# Patient Record
Sex: FEMALE | Race: WHITE | NOT HISPANIC OR LATINO | Employment: OTHER | URBAN - METROPOLITAN AREA
[De-identification: names, ages, dates, MRNs, and addresses within clinical notes are randomized per-mention and may not be internally consistent; named-entity substitution may affect disease eponyms.]

---

## 2023-10-13 ENCOUNTER — OFFICE VISIT (OUTPATIENT)
Dept: URGENT CARE | Facility: CLINIC | Age: 68
End: 2023-10-13
Payer: MEDICARE

## 2023-10-13 VITALS
OXYGEN SATURATION: 95 % | SYSTOLIC BLOOD PRESSURE: 122 MMHG | BODY MASS INDEX: 27.07 KG/M2 | RESPIRATION RATE: 16 BRPM | DIASTOLIC BLOOD PRESSURE: 89 MMHG | HEART RATE: 107 BPM | WEIGHT: 148 LBS | TEMPERATURE: 98.1 F

## 2023-10-13 DIAGNOSIS — E86.0 DEHYDRATION: ICD-10-CM

## 2023-10-13 DIAGNOSIS — R10.84 GENERALIZED ABDOMINAL PAIN: Primary | ICD-10-CM

## 2023-10-13 PROCEDURE — 99203 OFFICE O/P NEW LOW 30 MIN: CPT | Performed by: PHYSICIAN ASSISTANT

## 2023-10-13 PROCEDURE — 1159F MED LIST DOCD IN RCRD: CPT | Performed by: PHYSICIAN ASSISTANT

## 2023-10-13 PROCEDURE — 1036F TOBACCO NON-USER: CPT | Performed by: PHYSICIAN ASSISTANT

## 2023-10-13 RX ORDER — ATORVASTATIN CALCIUM 10 MG/1
10 TABLET, FILM COATED ORAL DAILY
COMMUNITY

## 2023-10-13 RX ORDER — LEVOTHYROXINE SODIUM 112 UG/1
112 CAPSULE ORAL
COMMUNITY
Start: 2015-08-27

## 2023-10-13 RX ORDER — FLUTICASONE PROPIONATE 50 MCG
1 SPRAY, SUSPENSION (ML) NASAL DAILY
COMMUNITY
Start: 2015-11-20

## 2023-10-13 RX ORDER — ALENDRONATE SODIUM 70 MG/1
70 TABLET ORAL
COMMUNITY

## 2023-10-13 RX ORDER — MINERAL OIL
ENEMA (ML) RECTAL
COMMUNITY
Start: 2014-07-22

## 2023-10-13 ASSESSMENT — ENCOUNTER SYMPTOMS
DIARRHEA: 1
ABDOMINAL PAIN: 1

## 2023-10-13 NOTE — PROGRESS NOTES
Subjective   Patient ID: Fawn Dillard is a 67 y.o. female.    Patient is a 67-year-old female who complains of worsening generalized abdominal pain and diarrhea that she has been experiencing for the past 1 week.  Patient reports that she has not developed episodes of nausea or vomiting, however she is experiencing difficulty tolerating food and fluids.  Patient describes bile cramping associated with an overall bloating sensation to her diffuse abdomen.  Patient reports no history of GERD, hiatal hernia, gallbladder disease, diverticulitis or other GI conditions.  Patient states she is feeling increasingly weak and ill.      Diarrhea  Associated symptoms: abdominal pain        The following portions of the chart were reviewed this encounter and updated as appropriate:         Review of Systems   Gastrointestinal:  Positive for abdominal pain and diarrhea.   All other systems reviewed and are negative.    Objective   Physical Exam  Vitals and nursing note reviewed.   Constitutional:       Appearance: Normal appearance. She is normal weight. She is ill-appearing.   HENT:      Head: Normocephalic.      Mouth/Throat:      Mouth: Mucous membranes are moist.      Pharynx: Oropharynx is clear.   Eyes:      Extraocular Movements: Extraocular movements intact.      Conjunctiva/sclera: Conjunctivae normal.      Pupils: Pupils are equal, round, and reactive to light.   Pulmonary:      Effort: Pulmonary effort is normal.      Breath sounds: Normal breath sounds.   Abdominal:      General: Abdomen is flat.      Palpations: Abdomen is soft.      Tenderness: There is abdominal tenderness.   Skin:     General: Skin is warm and dry.      Capillary Refill: Capillary refill takes less than 2 seconds.   Neurological:      General: No focal deficit present.      Mental Status: She is alert and oriented to person, place, and time.   Psychiatric:         Mood and Affect: Mood normal.         Behavior: Behavior normal.          Thought Content: Thought content normal.         Judgment: Judgment normal.       Procedures    Assessment/Plan   Physical exam findings as noted above.  Patient was immediately advised that she requires evaluation in an emergency department for laboratory testing, IV fluid hydration and CT scan imaging can be obtained.  Patient verbalizes clear understanding of the above recommendation and states that she will report to an emergency department immediately after departing this urgent care facility.    CLINICAL IMPRESSION:  Abdominal Pain;  Dehydration

## 2023-11-15 ENCOUNTER — OFFICE VISIT (OUTPATIENT)
Dept: SURGERY | Facility: CLINIC | Age: 68
End: 2023-11-15
Payer: MEDICARE

## 2023-11-15 DIAGNOSIS — K82.8 SLUDGE IN GALLBLADDER: Primary | ICD-10-CM

## 2023-11-15 DIAGNOSIS — K21.9 GASTROESOPHAGEAL REFLUX DISEASE, UNSPECIFIED WHETHER ESOPHAGITIS PRESENT: ICD-10-CM

## 2023-11-15 PROCEDURE — 1159F MED LIST DOCD IN RCRD: CPT | Performed by: PHYSICIAN ASSISTANT

## 2023-11-15 PROCEDURE — 99213 OFFICE O/P EST LOW 20 MIN: CPT | Performed by: PHYSICIAN ASSISTANT

## 2023-11-15 PROCEDURE — 1036F TOBACCO NON-USER: CPT | Performed by: PHYSICIAN ASSISTANT

## 2023-11-15 NOTE — PROGRESS NOTES
Subjective   Patient ID: Fawn Gomezdczuk is a 68 y.o. female who presents for New Patient Visit (Gallbladder sluge).    HPI  This 68-year-old female was having some digestive issues few weeks back.  She thought she had the flu.  Having some mid epigastric pain and reflux issues.  There was a work-up done CAT scan and ultrasound and showed sludge in her gallbladder.  She was placed on an PPI she has had improvement.  But was sent here by family practice because of the sludge in her gallbladder.  She is not currently having any abdominal pain no nausea no vomiting.      Review of Systems  Negative other than mentioned in HPI    ENT: No earache, no sore throat, no nosebleeds  Cardiovascular: No chest pain, no shortness of breath, no leg pain, no edema  Respiratory: No shortness of breath on exertion, no wheezing  Gastrointestinal: No abdominal pain, no melena, no nausea, vomiting and/or diarrhea  Musculoskeletal: No pain moving all extremities, no back pain ambulating normally  Skin: No rashes, no lesions, and no skin changes  Neuro: No headache, no confusion, no numbness and tingling  Psychiatric, normal mood, not suicidal, not homicidal, feeling good        Physical Exam  Eyes: Conjunctiva non -icteric and eye lids are without obvious rash or drooping. Pupils are symmetric.   Ears, Nose, Mouth, and Throat: External ears and nose appear to be without deformity or rash. No lesions or masses noted. Hearing is grossly intact.   Neck:. No JVD noted, tracheal position is midline. No thyromegaly, no thyroid nodules  Head and Face: Examination of the head and face revealed no abnormalities.   Respiratory: No gasping or shortness of breath noted, no use of accessory muscles noted. Clear to auscultate bilaterally  Cardiovascular: Examination for edema is normal. Regular rate and rhythm S1 S2 without murmurs  GI: Abdomen non tender to palpation, bowel sounds present no hepatosplenomegaly  Skin: No rashes or open  lesions/ulcers identified on skin.   Musk: Digits/nails show no clubbing or cyanosis. No asymmetry or masses noted of the musculature. Examination of the muscles/joints/bones show normal range of motion. Gait is grossly normally.   Neurologic: Cranial nerves II- XII intact, motor strength 5/5 muscle strength of the lower extremities bilaterally and equal.      Objective     No diagnosis found.   There is no problem list on file for this patient.     No Known Allergies   Medication Documentation Review Audit       Reviewed by Alicja Dvais MA (Medical Assistant) on 11/15/23 at 1319      Medication Order Taking? Sig Documenting Provider Last Dose Status   alendronate (Fosamax) 70 mg tablet [id number] Yes Take 1 tablet (70 mg) by mouth 1 (one) time per week. Historical Provider, MD Taking Active   atorvastatin (Lipitor) 10 mg tablet [id number] Yes Take 1 tablet (10 mg) by mouth once daily. Historical Provider, MD Taking Active   fexofenadine (Allegra Allergy) 180 mg tablet [id number] Yes  Historical Provider, MD Taking Active   fluticasone (Flonase) 50 mcg/actuation nasal spray [id number] Yes Administer 1 spray into affected nostril(s) once daily. Historical Provider, MD Taking Active   levothyroxine (Tirosint) 112 mcg capsule [id number] Yes 1 capsule (112 mcg). Historical Provider, MD Taking Active                    Past Medical History:   Diagnosis Date    Asymptomatic menopausal state 08/02/2022    Postmenopausal    Conductive hearing loss, bilateral 11/10/2022    Conductive hearing loss, bilateral    Encounter for screening mammogram for malignant neoplasm of breast 08/02/2022    Visit for screening mammogram    Pain in right shoulder 01/20/2015    Right shoulder pain    Rash and other nonspecific skin eruption 07/27/2015    Rash    Unspecified nonsuppurative otitis media, left ear 11/20/2015    Otitis media with effusion, left     Social History     Tobacco Use   Smoking Status Former    Packs/day: 1    Types:  Cigarettes    Quit date:     Years since quittin.8   Smokeless Tobacco Never     No family history on file.   History reviewed. No pertinent surgical history.    Assessment/Plan   Today we had a discussion about laparoscopic cholecystectomy, possible open .  Patient was informed that this is an outpatient surgery.  That surgery requires an hour and a half.  The procedure will be  done through 4 small incisions or possible opened procedure .   General anesthesia is required.  Patient was also instructed that if during surgery there were stones found within her ducts that they would require an overnight stay in the hospital and a gastroenterology consult and possible ERCP with stent placement or removal of duct stones.  Patient will need a ride to and from the hospital.  Risk and benefits such as bleeding and infection were discussed.  Alternative treatment was discussed and explained.  All questions were answered.  Patient would like to proceed.     Encounter Diagnoses   Name Primary?    Sludge in gallbladder Yes    Gastroesophageal reflux disease, unspecified whether esophagitis present      I have reviewed all data including labs,radiologic and previous reports.      **Portions of this medical record have been created using voice recognition software and may have minor errors which are inherent in voice recognition systems. It has not been fully edited for typographical or grammatical errors**

## 2023-12-06 ENCOUNTER — TELEPHONE (OUTPATIENT)
Dept: SURGERY | Facility: CLINIC | Age: 68
End: 2023-12-06

## 2023-12-06 NOTE — TELEPHONE ENCOUNTER
Patient called after having her gallbladder out and was giving a RX for Ursodiol 250mg. She was wondering if she needed to take this medication. After speaking to Dr Baeza he stated that there was sludge and bile thickening and she would benefit from taking this medication for the next month. Pt was then made aware of his decision. RX was sent to her pharmacy.

## 2023-12-19 ENCOUNTER — OFFICE VISIT (OUTPATIENT)
Dept: SURGERY | Facility: CLINIC | Age: 68
End: 2023-12-19
Payer: MEDICARE

## 2023-12-19 VITALS
HEIGHT: 62 IN | DIASTOLIC BLOOD PRESSURE: 60 MMHG | WEIGHT: 145 LBS | BODY MASS INDEX: 26.68 KG/M2 | OXYGEN SATURATION: 98 % | SYSTOLIC BLOOD PRESSURE: 100 MMHG | TEMPERATURE: 96.8 F | HEART RATE: 61 BPM

## 2023-12-19 DIAGNOSIS — K82.8 SLUDGE IN GALLBLADDER: Primary | ICD-10-CM

## 2023-12-19 PROCEDURE — 1160F RVW MEDS BY RX/DR IN RCRD: CPT | Performed by: SURGERY

## 2023-12-19 PROCEDURE — 1126F AMNT PAIN NOTED NONE PRSNT: CPT | Performed by: SURGERY

## 2023-12-19 PROCEDURE — 99024 POSTOP FOLLOW-UP VISIT: CPT | Performed by: SURGERY

## 2023-12-19 PROCEDURE — 1159F MED LIST DOCD IN RCRD: CPT | Performed by: SURGERY

## 2023-12-19 PROCEDURE — 1036F TOBACCO NON-USER: CPT | Performed by: SURGERY

## 2023-12-19 ASSESSMENT — PAIN SCALES - GENERAL: PAINLEVEL: 0-NO PAIN

## 2023-12-19 NOTE — PROGRESS NOTES
Subjective   Patient ID: Fawn Gomezdczuk is a 68 y.o. female who presents for Post-op.  No complaints    HPI history of cholecystectomy  Review of Systems  Physical Exam  All incision healed by the primary intention  Objective     No diagnosis found.   There is no problem list on file for this patient.     No Known Allergies   Medication Documentation Review Audit       Reviewed by Anjelica Gerardo MA (Medical Assistant) on 23 at 1443      Medication Order Taking? Sig Documenting Provider Last Dose Status   alendronate (Fosamax) 70 mg tablet [id number] Yes Take 1 tablet (70 mg) by mouth 1 (one) time per week. Historical Provider, MD Taking Active   atorvastatin (Lipitor) 10 mg tablet [id number] Yes Take 1 tablet (10 mg) by mouth once daily. Historical Provider, MD Taking Active   fexofenadine (Allegra Allergy) 180 mg tablet [id number] Yes  Historical Provider, MD Taking Active   fluticasone (Flonase) 50 mcg/actuation nasal spray [id number] Yes Administer 1 spray into affected nostril(s) once daily. Historical Provider, MD Taking Active   levothyroxine (Tirosint) 112 mcg capsule [id number] Yes 1 capsule (112 mcg). Historical Provider, MD Taking Active                    Past Medical History:   Diagnosis Date    Asymptomatic menopausal state 2022    Postmenopausal    Conductive hearing loss, bilateral 11/10/2022    Conductive hearing loss, bilateral    Encounter for screening mammogram for malignant neoplasm of breast 2022    Visit for screening mammogram    Pain in right shoulder 2015    Right shoulder pain    Rash and other nonspecific skin eruption 2015    Rash    Unspecified nonsuppurative otitis media, left ear 2015    Otitis media with effusion, left     Social History     Tobacco Use   Smoking Status Former    Packs/day: 1    Types: Cigarettes    Quit date:     Years since quittin.9   Smokeless Tobacco Never     No family history on file.   No past surgical history on  file.    Assessment/Plan     Status post cholecystectomy.  No evidence of complications.  Recommend increase physical activity.  Follow-up as needed    Ron Baeza MD

## 2024-09-20 ENCOUNTER — CLINICAL SUPPORT (OUTPATIENT)
Dept: AUDIOLOGY | Facility: CLINIC | Age: 69
End: 2024-09-20
Payer: MEDICARE

## 2024-09-20 ENCOUNTER — APPOINTMENT (OUTPATIENT)
Dept: OTOLARYNGOLOGY | Facility: CLINIC | Age: 69
End: 2024-09-20
Payer: MEDICARE

## 2024-09-20 VITALS
WEIGHT: 150 LBS | SYSTOLIC BLOOD PRESSURE: 150 MMHG | BODY MASS INDEX: 27.6 KG/M2 | DIASTOLIC BLOOD PRESSURE: 80 MMHG | HEIGHT: 62 IN

## 2024-09-20 DIAGNOSIS — J30.9 CHRONIC ALLERGIC RHINITIS: ICD-10-CM

## 2024-09-20 DIAGNOSIS — H61.21 IMPACTED CERUMEN OF RIGHT EAR: ICD-10-CM

## 2024-09-20 DIAGNOSIS — H90.3 BILATERAL SENSORINEURAL HEARING LOSS: Primary | ICD-10-CM

## 2024-09-20 DIAGNOSIS — J34.2 DEVIATED NASAL SEPTUM: ICD-10-CM

## 2024-09-20 DIAGNOSIS — H90.3 SENSORINEURAL HEARING LOSS (SNHL) OF BOTH EARS: Primary | ICD-10-CM

## 2024-09-20 PROCEDURE — 69210 REMOVE IMPACTED EAR WAX UNI: CPT | Performed by: OTOLARYNGOLOGY

## 2024-09-20 PROCEDURE — 92550 TYMPANOMETRY & REFLEX THRESH: CPT | Performed by: AUDIOLOGIST

## 2024-09-20 PROCEDURE — 99213 OFFICE O/P EST LOW 20 MIN: CPT | Performed by: OTOLARYNGOLOGY

## 2024-09-20 PROCEDURE — 92557 COMPREHENSIVE HEARING TEST: CPT | Performed by: AUDIOLOGIST

## 2024-09-20 PROCEDURE — 1159F MED LIST DOCD IN RCRD: CPT | Performed by: OTOLARYNGOLOGY

## 2024-09-20 PROCEDURE — 3008F BODY MASS INDEX DOCD: CPT | Performed by: OTOLARYNGOLOGY

## 2024-09-20 PROCEDURE — 1036F TOBACCO NON-USER: CPT | Performed by: OTOLARYNGOLOGY

## 2024-09-20 PROCEDURE — 1160F RVW MEDS BY RX/DR IN RCRD: CPT | Performed by: OTOLARYNGOLOGY

## 2024-09-20 ASSESSMENT — PAIN SCALES - GENERAL: PAINLEVEL_OUTOF10: 0 - NO PAIN

## 2024-09-20 ASSESSMENT — ENCOUNTER SYMPTOMS: OCCASIONAL FEELINGS OF UNSTEADINESS: 0

## 2024-09-20 ASSESSMENT — PAIN - FUNCTIONAL ASSESSMENT: PAIN_FUNCTIONAL_ASSESSMENT: 0-10

## 2024-09-20 NOTE — PROGRESS NOTES
Impression:              1. Bilateral sensorineural hearing loss        2. Impacted cerumen of right ear        3. Chronic allergic rhinitis        4. Deviated nasal septum             Recommendations/Plan:  Using the operative microscope and alligator forcep I was able to remove all of her impacted cerumen from that right ear and she was feeling better.  I reassured the patient that her hearing is stable and has not changed from 2022.  The patient is still considering the possibility of a cochlear implant for that right ear because her word discrimination score is still low.    **This electronic medical record note was created with the use of voice recognition software.  Despite proofreading, typographical or grammatical errors may be present that could affect meaning of content **    Subjective:  Fawn presents to the office today as a checkup on her hearing.  She denies any significant changes and seems to be doing okay with her hearing aids.  She denies any vertigo or dizziness.  She has not had any upper respiratory complaints however she has had some allergies with postnasal drip.  She feels like her right ear is somewhat plugged with wax.    Objective:    Visit Vitals  /80 (BP Location: Right arm, Patient Position: Sitting, BP Cuff Size: Adult)        Current Outpatient Medications   Medication Instructions    alendronate (FOSAMAX) 70 mg, oral, Once Weekly    atorvastatin (LIPITOR) 10 mg, oral, Daily    fexofenadine (Allegra Allergy) 180 mg tablet     fluticasone (Flonase) 50 mcg/actuation nasal spray 1 spray, nasal, Daily    levothyroxine (TIROSINT) 112 mcg        No Known Allergies     Physical Exam:  Right ear-external canal is occluded with cerumen.  Using the operative microscope and alligator forcep I was able to remove this.  TM intact.  No effusion.  Mastoid nontender.  Left ear-external canal is patent.  TM intact.  No effusion.  Mastoid nontender.  Nose-septum is deviated to the left.   Turbinates mildly swollen.  No pus polyps or lesions.  Oral cavity-clear no lesions    Results:  I reviewed her recent audiogram and she does have a severe sensorineural loss in both ears.  Tympanic membrane's have good mobility on tympanometry.  Word recognition scores 28% in the right ear and 64% in the left ear.  Speech reception threshold is 75 dB in the right ear and 70 dB in the left ear.    Procedure:  []    Kt Sosa, DO

## 2024-09-20 NOTE — PROGRESS NOTES
AUDIOLOGY ADULT AUDIOMETRIC EVALUATION      Name:  Fawn Dillard  :  1955  Age:  68 y.o.  Date of Evaluation: 24    History:  Reason for visit:  Ms. Fawn Dillard was seen today as part of the visit with Kt Sosa D.O. for an evaluation of hearing.   Chief Complaint   Patient presents with    Hearing Loss      The patient stated she was being seen for a routine check and does not have any significant concerns at this time. Previous hearing testing performed on 10/26/22 indicated a severe essentially flat sensorineural hearing loss, bilaterally. Mentioned she is currently utilizing hearing aids dispensed from an outside facility and in general believes she has been able to hear well. Mentioned her friends have some concern she is not hearing as well as she should. Current hearing aids were purchased within the past year.  Denied any otalgia, aural fullness, tinnitus, ear pressure, dizziness/vertigo, ear surgery, recent ear/sinus infections, recent falls, significant noise exposure, sinus/throat concerns, ear drainage, or sudden hearing loss.  Note previous case history from 10/26/22:  The patient reported a longstanding history of hearing loss. Mentioned she has hearing aids that are approximately three years old, purchased at an outside facility. The aids may be [employer] devices with power receivers. She has been noticing even with her current hearing aids she continues to have some difficulty with speech clarity and understanding. Feels she still misses the information even with her hearing aids.   Her most recent hearing test may have been approximately one year ago, however, results were not available for review at this time. She is concerned that her hearing is changing, however, denied any sudden hearing loss. She is interested to understand why her hearing appears to be decreasing, and is not stable.   Denied any otalgia, aural fullness, recent ear/sinus infections, ear drainage,  dizziness/vertigo, tinnitus, or significant noise exposure.     EVALUATION     See Audiogram    RESULTS:    Otoscopic Evaluation:   Right Ear: Otoscopy revealed minimal cerumen with a healthy canal and a healthy tympanic membrane was visualized.   Left Ear: Otoscopy revealed a clear healthy canal and a healthy tympanic membrane was visualized.     Immittance:  Immittance Measures: 226 Hz   Right Ear: Tympanometric testing revealed a normal type A tympanogram with normal middle ear pressure and normal static compliance.  Left Ear: Tympanometric testing revealed a normal type A tympanogram with normal middle ear pressure and normal static compliance.    Right Ear: Ipsilateral acoustic reflexes were absent at, 500-4,000 Hz. Results are consistent with the test results.   Left Ear: Ipsilateral acoustic reflexes were present at, 500-1,000 Hz, at expected sensation levels and absent at 2,000-4,000 Hz.    Test technique:  Pure Tone Audiometry via insert earphones    Reliability:   excellent    Pure Tone Audiometry:    Right Ear: Audiometric testing indicated a severe sensorineural hearing loss across the frequency range.   Left Ear:   Audiometric testing indicated a severe sensorineural hearing loss across the frequency range.       Speech Audiometry:   Right Ear:  Speech Reception Threshold (SRT) was obtained at 75 dBHL                  Word Recognition scores were very poor (28%) in quiet when words were presented at 90 dBHL  Left Ear:  Speech Reception Threshold (SRT) was obtained at  70 dBHL                  Word Recognition scores were poor (64%) in quiet when words were presented at 90 dBHL  Testing was performed with recorded NU-6 speech words in quiet. Speech thresholds were in good agreement with the pure tone averages in each ear.     IMPRESSIONS:  Today's test results are  consistent with severe sensorineural hearing loss, bilaterally .  The patient was counseled with regard to the findings.    When compared to  the previous test results of 10/26/22 there has been essentially no change in either ear.     Amplification needs:  Hearing aids were recommended due to the hearing loss and the patient's concerns for hearing difficulties.     RECOMMENDATIONS:  * Continue medical follow up with Kt Sosa D.O.  * Retest as medically indicated, or sooner if a change in hearing sensitivity is noticed.   * Wear hearing protection while in the presence of loud sounds.   * Use effective communication strategies such as asking the speaker to gain attention prior to speaking, speaking in the same room, repeating words that were heard, etc.  * Consider returning to current hearing aid provider for hearing aid adjustments.   * Consider a cochlear implant evaluation with the [facility] due to significant hearing loss and poor word recognition scores.    PATIENT EDUCATION:   Discussed results and recommendations with the patient and a copy of the hearing test was provided.  Questions were addressed and the patient was encouraged to contact our department should concerns arise.  The patient was seen from  10:00-10:30 am.